# Patient Record
Sex: MALE | Race: WHITE | URBAN - METROPOLITAN AREA
[De-identification: names, ages, dates, MRNs, and addresses within clinical notes are randomized per-mention and may not be internally consistent; named-entity substitution may affect disease eponyms.]

---

## 2024-01-05 ENCOUNTER — APPOINTMENT (OUTPATIENT)
Dept: CT IMAGING | Age: 59
End: 2024-01-05
Payer: OTHER MISCELLANEOUS

## 2024-01-05 ENCOUNTER — APPOINTMENT (OUTPATIENT)
Dept: GENERAL RADIOLOGY | Age: 59
End: 2024-01-05
Payer: OTHER MISCELLANEOUS

## 2024-01-05 ENCOUNTER — HOSPITAL ENCOUNTER (EMERGENCY)
Age: 59
Discharge: HOME | End: 2024-01-05
Payer: OTHER MISCELLANEOUS

## 2024-01-05 VITALS
DIASTOLIC BLOOD PRESSURE: 81 MMHG | WEIGHT: 235 LBS | SYSTOLIC BLOOD PRESSURE: 114 MMHG | BODY MASS INDEX: 31.14 KG/M2 | HEART RATE: 67 BPM | RESPIRATION RATE: 18 BRPM | TEMPERATURE: 98.2 F | OXYGEN SATURATION: 97 % | HEIGHT: 73 IN

## 2024-01-05 DIAGNOSIS — S16.1XXA STRAIN OF NECK MUSCLE, INITIAL ENCOUNTER: ICD-10-CM

## 2024-01-05 DIAGNOSIS — S29.012A STRAIN OF THORACIC BACK REGION: ICD-10-CM

## 2024-01-05 DIAGNOSIS — M62.838 TRAPEZIUS MUSCLE SPASM: ICD-10-CM

## 2024-01-05 DIAGNOSIS — S46.912A STRAIN OF LEFT SHOULDER, INITIAL ENCOUNTER: ICD-10-CM
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
## 2024-01-05 PROCEDURE — 73030 X-RAY EXAM OF SHOULDER: CPT

## 2024-01-05 PROCEDURE — 71046 X-RAY EXAM CHEST 2 VIEWS: CPT

## 2024-01-05 PROCEDURE — 6370000000 HC RX 637 (ALT 250 FOR IP)

## 2024-01-05 PROCEDURE — 99284 EMERGENCY DEPT VISIT MOD MDM: CPT

## 2024-01-05 PROCEDURE — 72125 CT NECK SPINE W/O DYE: CPT

## 2024-01-05 PROCEDURE — 70450 CT HEAD/BRAIN W/O DYE: CPT

## 2024-01-05 PROCEDURE — 72070 X-RAY EXAM THORAC SPINE 2VWS: CPT

## 2024-01-05 RX ORDER — ACETAMINOPHEN 500 MG
1000 TABLET ORAL
Status: COMPLETED | OUTPATIENT
Start: 2024-01-05 | End: 2024-01-05

## 2024-01-05 RX ORDER — METHOCARBAMOL 500 MG/1
500 TABLET, FILM COATED ORAL 4 TIMES DAILY
Qty: 40 TABLET | Refills: 0 | Status: SHIPPED | OUTPATIENT
Start: 2024-01-05 | End: 2024-01-15

## 2024-01-05 RX ADMIN — ACETAMINOPHEN 1000 MG: 500 TABLET ORAL at 11:46

## 2024-01-05 ASSESSMENT — PAIN SCALES - GENERAL
PAINLEVEL_OUTOF10: 5
PAINLEVEL_OUTOF10: 5

## 2024-01-05 ASSESSMENT — PAIN DESCRIPTION - LOCATION
LOCATION: NECK
LOCATION: SHOULDER;NECK;BACK

## 2024-01-05 ASSESSMENT — PAIN - FUNCTIONAL ASSESSMENT: PAIN_FUNCTIONAL_ASSESSMENT: 0-10

## 2024-01-05 ASSESSMENT — LIFESTYLE VARIABLES
HOW OFTEN DO YOU HAVE A DRINK CONTAINING ALCOHOL: NEVER
HOW MANY STANDARD DRINKS CONTAINING ALCOHOL DO YOU HAVE ON A TYPICAL DAY: PATIENT DOES NOT DRINK

## 2024-01-05 NOTE — ED NOTES
I have reviewed discharge instructions with the patient.  The patient verbalized understanding.    Patient left ED via Discharge Method: ambulatory to Home with wife.    Opportunity for questions and clarification provided.       Patient given 1 scripts.         To continue your aftercare when you leave the hospital, you may receive an automated call from our care team to check in on how you are doing.  This is a free service and part of our promise to provide the best care and service to meet your aftercare needs.” If you have questions, or wish to unsubscribe from this service please call [phone].  Thank you for Choosing our [facility] Emergency Department.        Letty Bowman, RN  01/05/24 1466
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
## 2024-01-05 NOTE — DISCHARGE INSTR - COC
Continuity of Care Form    Patient Name: Benjamín Chen   :  1965  MRN:  [id number]    Admit date:  2024  Discharge date:  ***    Code Status Order: No Order   Advance Directives:     Admitting Physician:  No admitting provider for patient encounter.  PCP: No primary care provider on file.    Discharging Nurse: ***  Discharging Hospital Unit/Room#: D02/D02  Discharging Unit Phone Number: ***    Emergency Contact:   No emergency contact information on file.    Past Surgical History:  No past surgical history on file.    Immunization History:     There is no immunization history on file for this patient.    Active Problems:  There is no problem list on file for this patient.      Isolation/Infection:   Isolation            No Isolation           Unreconciled Outside Infections       External data in this report might not trigger clinical decision support.    .      Infection Onset Last Indicated Last Received Source    No mapped external infections found      .    Unmapped Infections        COVID19 Negative 20 [facility]                  Patient Infection Status       None to display            Nurse Assessment:  Last Vital Signs: /80   Pulse 68   Temp 97.8 °F (36.6 °C) (Oral)   Resp 16   Ht 1.854 m (6' 1\")   Wt 106.6 kg (235 lb)   SpO2 98%   BMI 31.00 kg/m²     Last documented pain score (0-10 scale): Pain Level: 5  Last Weight:   Wt Readings from Last 1 Encounters:   24 106.6 kg (235 lb)     Mental Status:  {IP PT MENTAL STATUS:90991}    IV Access:  { KASEY IV ACCESS:659323470}    Nursing Mobility/ADLs:  Walking   {CHP DME ADLs:612080834}  Transfer  {CHP DME ADLs:132321248}  Bathing  {CHP DME ADLs:847471569}  Dressing  {CHP DME ADLs:923018456}  Toileting  {CHP DME ADLs:353824234}  Feeding  {CHP DME ADLs:931626285}  Med Admin  {CHP DME ADLs:744655252}  Med Delivery   { KASEY MED Delivery:382112180}    Wound Care Documentation and Therapy:

## 2024-01-05 NOTE — ED PROVIDER NOTES
Socioeconomic History    Marital status:         Discharge Medication List as of 1/5/2024 11:43 AM           Results for orders placed or performed during the hospital encounter of 01/05/24   CT HEAD WO CONTRAST    Narrative    Head CT    INDICATION: Altered mental status    Multiple axial images obtained through the brain without intravenous contrast.   Radiation dose reduction techniques were used for this study:  All CT scans  performed at this facility use one or all of the following: Automated exposure  control, adjustment of the mA and/or kVp according to patient's size, iterative  reconstruction.    COMPARISON: None    FINDINGS: No areas of abnormal attenuation are seen in the brain. There is no CT  evidence of acute hemorrhage or infarction. The ventricles are normal in size.  There are no extra-axial fluid collections. No masses are seen. The sinuses are  clear. There are no bony lesions.      Impression    No CT evidence of acute intracranial abnormality.   CT CERVICAL SPINE WO CONTRAST    Narrative    CT of the Cervical Spine    INDICATION: Pain    Multiple axial images were obtained through the cervical spine without  intravenous contrast. Coronal and sagittal reformatted images were also  reviewed.  Radiation dose reduction techniques were used for this study:  All CT  scans performed at this facility use one or all of the following: Automated  exposure control, adjustment of the mA and/or kVp according to patient's size,  iterative reconstruction.    COMPARISON: None    FINDINGS:  There is no evidence of fracture or subluxation.  No bony lesions are seen.   There is no prevertebral soft tissue swelling. Degenerative syndesmophyte  formation suggesting DISH syndrome. Pedicle alignment and facet alignment are  both symmetric. No disc herniation. No canal or foraminal stenosis.      Impression    Marked degenerative change compatible with DISH syndrome.    No radiographic evidence of acute